# Patient Record
Sex: MALE | Race: WHITE | NOT HISPANIC OR LATINO | Employment: STUDENT | URBAN - METROPOLITAN AREA
[De-identification: names, ages, dates, MRNs, and addresses within clinical notes are randomized per-mention and may not be internally consistent; named-entity substitution may affect disease eponyms.]

---
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
## 2022-08-30 ENCOUNTER — OFFICE VISIT (OUTPATIENT)
Dept: URGENT CARE | Facility: URGENT CARE | Age: 22
End: 2022-08-30
Payer: COMMERCIAL

## 2022-08-30 VITALS
HEART RATE: 58 BPM | TEMPERATURE: 98.7 F | SYSTOLIC BLOOD PRESSURE: 148 MMHG | WEIGHT: 197.2 LBS | OXYGEN SATURATION: 98 % | DIASTOLIC BLOOD PRESSURE: 85 MMHG

## 2022-08-30 DIAGNOSIS — S81.811A LACERATION OF LEG, RIGHT, INITIAL ENCOUNTER: Primary | ICD-10-CM

## 2022-08-30 PROCEDURE — 12001 RPR S/N/AX/GEN/TRNK 2.5CM/<: CPT | Performed by: PHYSICIAN ASSISTANT

## 2022-08-30 ASSESSMENT — ENCOUNTER SYMPTOMS
WHEEZING: 0
CHILLS: 0
PALPITATIONS: 0
CHEST TIGHTNESS: 0
COLOR CHANGE: 1
RHINORRHEA: 0
COUGH: 0
WOUND: 1
SORE THROAT: 0
FEVER: 0
RESPIRATORY NEGATIVE: 1
CARDIOVASCULAR NEGATIVE: 1
SHORTNESS OF BREATH: 0
FATIGUE: 0

## 2022-08-31 NOTE — PROGRESS NOTES
Flavia Rizzo is a 21 year old accompanied by his girlfriend, presenting for the following health issues:  Laceration (Rt leg/calf laceration while rock climbing 08/30/22. )    HPI   Concern - R leg laceration  Onset: today  Description:  Sustained a laceration to his R lower leg after he had fallen from rock climbing.  No head or neck injuries or LOC.  Last Tdap was 2013.  Intensity: moderate  Progression of Symptoms:  same  Accompanying Signs & Symptoms: No radicular pain, numbness, tingling or weakness.  No swelling, redness, drainage or fevers.    Previous history of similar problem: no  Precipitating factors:        Worsened by: none  Alleviating factors:        Improved by: none  Therapies tried and outcome: compression, tegaderm, steristrips with minimal relief    Patient Active Problem List   Diagnosis   (none) - all problems resolved or deleted     No current outpatient medications on file.     No current facility-administered medications for this visit.      No Known Allergies    Review of Systems   Constitutional: Negative for chills, fatigue and fever.   HENT: Negative.  Negative for congestion, ear pain, rhinorrhea and sore throat.    Respiratory: Negative.  Negative for cough, chest tightness, shortness of breath and wheezing.    Cardiovascular: Negative.  Negative for chest pain, palpitations and peripheral edema.   Skin: Positive for color change and wound. Negative for pallor and rash.   All other systems reviewed and are negative.           Objective    BP (!) 148/85 (BP Location: Left arm, Patient Position: Sitting, Cuff Size: Adult Large)   Pulse 58   Temp 98.7  F (37.1  C) (Tympanic)   Wt 89.4 kg (197 lb 3.2 oz)   SpO2 98%   There is no height or weight on file to calculate BMI.  Physical Exam  Vitals and nursing note reviewed.   Constitutional:       General: He is not in acute distress.     Appearance: Normal appearance. He is normal weight. He is not ill-appearing.    Musculoskeletal:      Right lower leg: Laceration (2.5cm vertical laceration present over the lateral lower leg.  no erythema or discharge present) and tenderness present. No swelling, deformity or bony tenderness. No edema.      Left lower leg: Normal.   Skin:     General: Skin is warm.      Capillary Refill: Capillary refill takes less than 2 seconds.   Neurological:      Mental Status: He is alert and oriented to person, place, and time.      Sensory: Sensation is intact.      Motor: Motor function is intact.      Gait: Gait is intact.      Deep Tendon Reflexes: Reflexes are normal and symmetric.   Psychiatric:         Mood and Affect: Mood normal.         Behavior: Behavior normal.         Thought Content: Thought content normal.         Judgment: Judgment normal.     Procedure:  Discussed risks and benefits of procedure and patient had decided to proceed. Laceration was irrigated with normal saline and then sterilized with betadine swabs X3.  This was then anesthetized with 3cc of 1% lidocaine with epi.  Laceration was repaired with five 4-0 ethilon sutures in a simple interrupted fashion and dressed with bacitracin, gauze and coban.  Minimal bleeding.  Patient tolerated procedure well.          Assessment/Plan:  Laceration of leg, right, initial encounter: No evidence of infection at this time.  He is UTD on his Tdap. This was closed with sutures and placed in dressings.  Keep clean and dry for the next 24hours.  Tylenol/ibuprofen as needed for pain.  RTC in 7-10days for suture removal.  RTC sooner if he develops worsening pain, numbness, redness, drainage or fevers.   -     REPAIR SUPERFICIAL, WOUND BODY < =2.5CM        Sue See BULL [place].

## 2022-09-01 ENCOUNTER — NURSE TRIAGE (OUTPATIENT)
Dept: NURSING | Facility: CLINIC | Age: 22
End: 2022-09-01

## 2022-09-01 NOTE — TELEPHONE ENCOUNTER
"Nurse Triage SBAR    Is this a 2nd Level Triage? NO    Situation: Significant Other calling with concern about pt's sutures.  Consent: not needed as pt was able to get on the phone and I spoke to him.     Background:  Significant other Glory calling to share that pt was seen earlier this week and had a leg laceration repaired.  States she is worried about him as he's sleeping more than normal and she is noting redness around the area of his sutures and states it's warm to the touch.  This significant other is not listed on a Consent to Communicate form so at this time.  Advised caller that she would need to either wake up the pt to speak with me or call back when he was awake.  At this time the pt did come to the phone and completed the rest of the triage call with writer.     Assessment:   Pt states mild redness and no concern for infection.  States pain is gone today \"It feels pretty good today\".    No discharge - Pt states this is mildly red/pinkish.  Pt states \"I think it could be from sleeping on it but I could be wrong\".  Pt states he is not noting any increased warmth compared to the other leg.      Protocol Recommended Disposition:   Home Care    Recommendation: Advised patient to do home care. Home care reviewed.  . Reviewed concerning symptoms and when to call back. Pt states he will call back if redness worsens or spreads or if area becomes warm to touch or painful.      Makayla Osorio RN [facility] 2022 6:52 PM          Reason for Disposition    Wound doesn't sound infected    Additional Information    Negative: [1] Widespread rash AND [2] bright red, sunburn-like AND [3] too weak to stand    Negative: Sounds like a life-threatening emergency to the triager    Negative: Stitches (or staples) and not infected    Negative: Skin glue used to close wound and not infected    Negative:  surgical wound infection suspected    Negative: Surgical wound infection suspected " (post-op)    Negative: Animal bite wound infection suspected    Negative: Chronic wound care and Negative Pressure Wound Therapy (NPWT) symptoms and questions    Negative: [1] Widespread rash AND [2] bright red, sunburn-like    Negative: SEVERE pain in the wound    Negative: Black (necrotic) or blisters develop in wound    Negative: Patient sounds very sick or weak to the triager    Negative: [1] Looks infected (spreading redness, red streak, pus) AND [2] fever    Negative: [1] Red streak runs from the wound AND [2] longer than 1 inch (2.5 cm)    Negative: [1] Skin around the wound has become red AND [2] larger than 2 inches (5 cm)    Negative: [1] Face wound AND [2] looks infected (e.g., spreading redness)    Negative: [1] Finger wound AND [2] entire finger swollen    Negative: [1] Red area or streak AND [2] no fever    Negative: [1] Pus or cloudy fluid draining from wound AND [2] no fever    Negative: [1] Wound > 48 hours old AND [2] it becomes more tender    Negative: Other signs of wound infection    Negative: [1] Taking antibiotic > 72 hours (3 days) AND [2] infected wound not improved (i.e., pain, pus, redness)    Negative: [1] Taking antibiotic > 48 hours (2 days) AND [2] fever persists    Negative: [1] No prior tetanus shots (or is not fully vaccinated) AND [2] any wound (e.g., cut, scrape)    Negative: [1] Last tetanus shot > 5 years ago and [2] wound from DIRTY cut or scrape    Negative: Last tetanus shot > 10 years ago    Negative: Pimple where a stitch comes through the skin    Protocols used: WOUND INFECTION-A-AH

## 2022-09-18 ENCOUNTER — HEALTH MAINTENANCE LETTER (OUTPATIENT)
Age: 22
End: 2022-09-18

## 2023-05-01 ENCOUNTER — TRANSCRIBE ORDERS (OUTPATIENT)
Dept: OTHER | Age: 23
End: 2023-05-01

## 2023-05-01 DIAGNOSIS — M25.512 CHRONIC LEFT SHOULDER PAIN: Primary | ICD-10-CM

## 2023-05-01 DIAGNOSIS — Z47.89 ORTHOPEDIC AFTERCARE: ICD-10-CM

## 2023-05-01 DIAGNOSIS — G89.29 CHRONIC LEFT SHOULDER PAIN: Primary | ICD-10-CM

## 2023-05-01 DIAGNOSIS — G25.89 SCAPULAR DYSKINESIS: ICD-10-CM

## 2023-05-01 DIAGNOSIS — M75.41 IMPINGEMENT SYNDROME OF RIGHT SHOULDER: ICD-10-CM

## 2023-05-25 ENCOUNTER — THERAPY VISIT (OUTPATIENT)
Dept: PHYSICAL THERAPY | Facility: CLINIC | Age: 23
End: 2023-05-25
Payer: COMMERCIAL

## 2023-05-25 DIAGNOSIS — M25.511 CHRONIC PAIN OF BOTH SHOULDERS: ICD-10-CM

## 2023-05-25 DIAGNOSIS — G89.29 CHRONIC PAIN OF BOTH SHOULDERS: ICD-10-CM

## 2023-05-25 DIAGNOSIS — M25.512 CHRONIC PAIN OF BOTH SHOULDERS: ICD-10-CM

## 2023-05-25 PROCEDURE — 97110 THERAPEUTIC EXERCISES: CPT | Mod: GP | Performed by: PHYSICAL THERAPIST

## 2023-05-25 PROCEDURE — 97161 PT EVAL LOW COMPLEX 20 MIN: CPT | Mod: GP | Performed by: PHYSICAL THERAPIST

## 2023-05-25 PROCEDURE — 97112 NEUROMUSCULAR REEDUCATION: CPT | Mod: GP | Performed by: PHYSICAL THERAPIST
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
## 2023-05-31 ENCOUNTER — THERAPY VISIT (OUTPATIENT)
Dept: PHYSICAL THERAPY | Facility: CLINIC | Age: 23
End: 2023-05-31
Payer: COMMERCIAL

## 2023-05-31 DIAGNOSIS — M25.512 CHRONIC PAIN OF BOTH SHOULDERS: Primary | ICD-10-CM

## 2023-05-31 DIAGNOSIS — G89.29 CHRONIC PAIN OF BOTH SHOULDERS: Primary | ICD-10-CM

## 2023-05-31 DIAGNOSIS — M25.511 CHRONIC PAIN OF BOTH SHOULDERS: Primary | ICD-10-CM

## 2023-05-31 PROCEDURE — 97112 NEUROMUSCULAR REEDUCATION: CPT | Mod: GP | Performed by: PHYSICAL THERAPIST

## 2023-05-31 PROCEDURE — 97110 THERAPEUTIC EXERCISES: CPT | Mod: GP | Performed by: PHYSICAL THERAPIST

## 2023-06-19 ENCOUNTER — THERAPY VISIT (OUTPATIENT)
Dept: PHYSICAL THERAPY | Facility: CLINIC | Age: 23
End: 2023-06-19
Attending: ORTHOPAEDIC SURGERY
Payer: COMMERCIAL

## 2023-06-19 DIAGNOSIS — G89.29 CHRONIC PAIN OF BOTH SHOULDERS: Primary | ICD-10-CM

## 2023-06-19 DIAGNOSIS — M25.511 CHRONIC PAIN OF BOTH SHOULDERS: Primary | ICD-10-CM

## 2023-06-19 DIAGNOSIS — M25.512 CHRONIC PAIN OF BOTH SHOULDERS: Primary | ICD-10-CM

## 2023-06-19 PROCEDURE — 97112 NEUROMUSCULAR REEDUCATION: CPT | Mod: GP | Performed by: PHYSICAL THERAPIST

## 2023-06-19 PROCEDURE — 97110 THERAPEUTIC EXERCISES: CPT | Mod: GP | Performed by: PHYSICAL THERAPIST

## 2023-07-28 ENCOUNTER — THERAPY VISIT (OUTPATIENT)
Dept: PHYSICAL THERAPY | Facility: CLINIC | Age: 23
End: 2023-07-28
Payer: COMMERCIAL

## 2023-07-28 DIAGNOSIS — M25.511 CHRONIC PAIN OF BOTH SHOULDERS: Primary | ICD-10-CM

## 2023-07-28 DIAGNOSIS — G89.29 CHRONIC PAIN OF BOTH SHOULDERS: Primary | ICD-10-CM

## 2023-07-28 DIAGNOSIS — M25.512 CHRONIC PAIN OF BOTH SHOULDERS: Primary | ICD-10-CM

## 2023-07-28 PROCEDURE — 97112 NEUROMUSCULAR REEDUCATION: CPT | Mod: GP | Performed by: PHYSICAL THERAPIST

## 2023-07-28 PROCEDURE — 97110 THERAPEUTIC EXERCISES: CPT | Mod: GP | Performed by: PHYSICAL THERAPIST

## 2023-09-11 ENCOUNTER — THERAPY VISIT (OUTPATIENT)
Dept: PHYSICAL THERAPY | Facility: CLINIC | Age: 23
End: 2023-09-11
Payer: COMMERCIAL

## 2023-09-11 DIAGNOSIS — G89.29 CHRONIC PAIN OF BOTH SHOULDERS: Primary | ICD-10-CM

## 2023-09-11 DIAGNOSIS — M25.511 CHRONIC PAIN OF BOTH SHOULDERS: Primary | ICD-10-CM

## 2023-09-11 DIAGNOSIS — M25.512 CHRONIC PAIN OF BOTH SHOULDERS: Primary | ICD-10-CM

## 2023-09-11 PROCEDURE — 97110 THERAPEUTIC EXERCISES: CPT | Mod: GP | Performed by: PHYSICAL THERAPIST

## 2023-09-11 PROCEDURE — 97112 NEUROMUSCULAR REEDUCATION: CPT | Mod: GP | Performed by: PHYSICAL THERAPIST

## 2023-10-08 ENCOUNTER — HEALTH MAINTENANCE LETTER (OUTPATIENT)
Age: 23
End: 2023-10-08

## 2023-10-11 ENCOUNTER — THERAPY VISIT (OUTPATIENT)
Dept: PHYSICAL THERAPY | Facility: CLINIC | Age: 23
End: 2023-10-11
Payer: COMMERCIAL

## 2023-10-11 DIAGNOSIS — M25.512 CHRONIC PAIN OF BOTH SHOULDERS: Primary | ICD-10-CM

## 2023-10-11 DIAGNOSIS — G89.29 CHRONIC PAIN OF BOTH SHOULDERS: Primary | ICD-10-CM

## 2023-10-11 DIAGNOSIS — M25.511 CHRONIC PAIN OF BOTH SHOULDERS: Primary | ICD-10-CM

## 2023-10-11 PROCEDURE — 97112 NEUROMUSCULAR REEDUCATION: CPT | Mod: GP | Performed by: PHYSICAL THERAPIST

## 2023-10-11 PROCEDURE — 97110 THERAPEUTIC EXERCISES: CPT | Mod: GP | Performed by: PHYSICAL THERAPIST

## 2023-11-16 ENCOUNTER — THERAPY VISIT (OUTPATIENT)
Dept: PHYSICAL THERAPY | Facility: CLINIC | Age: 23
End: 2023-11-16
Payer: COMMERCIAL

## 2023-11-16 DIAGNOSIS — G89.29 CHRONIC PAIN OF BOTH SHOULDERS: Primary | ICD-10-CM

## 2023-11-16 DIAGNOSIS — M25.512 CHRONIC PAIN OF BOTH SHOULDERS: Primary | ICD-10-CM

## 2023-11-16 DIAGNOSIS — M25.511 CHRONIC PAIN OF BOTH SHOULDERS: Primary | ICD-10-CM

## 2023-11-16 PROCEDURE — 97112 NEUROMUSCULAR REEDUCATION: CPT | Mod: GP | Performed by: PHYSICAL THERAPIST

## 2023-11-16 PROCEDURE — 97110 THERAPEUTIC EXERCISES: CPT | Mod: GP | Performed by: PHYSICAL THERAPIST

## 2023-11-17 NOTE — PROGRESS NOTES
"     11/16/23 0500   Appointment Info   Signing clinician's name / credentials Helena Pearson DPT, SCS   Total/Authorized Visits 6 (alejo e and t)   Visits Used 7   Medical Diagnosis Chronic left shoulder pain    Orthopedic aftercare    Scapular dyskinesis    Impingement syndrome of right shoulder   PT Tx Diagnosis B shoulder pain and instability   Progress Note/Certification   Onset of illness/injury or Date of Surgery   (had surgery in 2017 and subsequent injuries to both labrums in 2019)   Therapy Frequency 1x/wk for 4 weeks then every other week for 8 weeks   Predicted Duration 12 weeks   PT Goal 1   Goal Identifier reaching   Goal Description improve stability without pain for ADLS, and working out pain free with progression to adding chest/back and leg movements   Rationale to maximize safety and independence with performance of ADLs and functional tasks;to maximize safety and independence within the home;to maximize safety and independence with self cares  (to return to throwing a ball, fishing and working out)   Goal Progress pt continues to struggle with pain with various activities, goal extended   Target Date 12/25/23  (goal extended due to ADLS pain and pain with gym workouts)   Subjective Report   Subjective Report He reports that he feels like he has a little better control of his exercises but he doesn't not any changes with pain frequency, duration or intensity. He states that his pain occurs any time he lifts his arm. He reports he feels like he doesn't have to \"think\" about his shoulder pain as much and does report improvement with getting up from the floor, getting dressed,   Objective Measures   Objective Measures Objective Measure 1   Objective Measure 1   Objective Measure tolerated retraction with extension in standing and no longer had pain with prone ER at 90/90 and tolerated S/L ER as well. AROM was full and pain free today but pt says \"sometimes it hurts and I just don't know when it " "will\"   Details AROM is limited at end range but not because of pain today, R shoulder did display slight painful arc from  and L shoulder was pain free but AROM was 140/140/T9/75   Treatment Interventions (PT)   Interventions Therapeutic Procedure/Exercise;Neuromuscular Re-education;Therapeutic Activity   Therapeutic Procedure/Exercise   Therapeutic Procedures: strength, endurance, ROM, flexibillity minutes (46431) 23   Ther Proc 1 seated cervical  retraction without any change and retraction with extension which improved his L shoulder AROM ABD immediately   Ther Proc 1 - Details 2 x 10 no change in ER strength or pain with ABD strength but all mobility was immediately better after retraction with extension   PTRx Ther Proc 1 Prone Shoulder External Rotation   PTRx Ther Proc 1 - Details 20   PTRx Ther Proc 2 Supine Ceiling Punch   PTRx Ther Proc 2 - Details rev   PTRx Ther Proc 3 scap SA wall slide x 15   PTRx Ther Proc 4 Shoulder External Rotation Sidelying   PTRx Ther Proc 4 - Details 15   PTRx Ther Proc 5 rev exercises for gym and ADLS posture to mitigate pain x 6'   PTRx Ther Proc 5 - Details rev   Patient Response/Progress brittny without pain   Neuromuscular Re-education   Neuromuscular re-ed of mvmt, balance, coord, kinesthetic sense, posture, proprioception minutes (49904) 10   Neuro Re-ed 1 rev posture with sleeping and doing his neck exercise first thing in the am   PTRx Neuro Re-ed 1 Seated Cervical Retraction Extension With Overpressure   PTRx Neuro Re-ed 1 - Details rev   PTRx Neuro Re-ed 2 Push-Up Plus At Counter   PTRx Neuro Re-ed 2 - Details x15 NMR for scap ret/dep   PTRx Neuro Re-ed 3 Prone Shoulder Horizontal Abduction   PTRx Neuro Re-ed 3 - Details rev   PTRx Neuro Re-ed 4 Shoulder Proprioception Milk Jug Abduction/Adduction   PTRx Neuro Re-ed 4 - Details rev   PTRx Neuro Re-ed 5 Proprioception At Counter Weight Shift   PTRx Neuro Re-ed 5 - Details 20   Patient Response/Progress pt " demonstrated understanding of proper form/posture   PTRx Neuro Re-ed 6 Serratus Slides on Wall   PTRx Neuro Re-ed 6 - Details 15   PTRx Neuro Re-ed 7 Posture Correction with Lumbar Roll   PTRx Neuro Re-ed 7 - Details x3' education   Skilled Intervention cuing for head posture and scap positioning   Plan   Home program see PTRX   Updates to plan of care took out lifting at gym, Will consider adding in slowly in 4 weeks but only doing 2 exercises at a time for 2 weeks   Plan for next session resume biceps curls and bent over row   Total Session Time   Timed Code Treatment Minutes 33   Total Treatment Time (sum of timed and untimed services) 33       PLAN  See MD tomorrow to determine next steps for shoulder and shoulder pain.     Beginning/End Dates of Progress Note Reporting Period:    to 11/16/2023    Referring Provider:  Naun Parra

## 2024-05-13 PROBLEM — M25.512 CHRONIC PAIN OF BOTH SHOULDERS: Status: RESOLVED | Noted: 2023-05-25 | Resolved: 2024-05-13

## 2024-05-13 PROBLEM — M25.511 CHRONIC PAIN OF BOTH SHOULDERS: Status: RESOLVED | Noted: 2023-05-25 | Resolved: 2024-05-13

## 2024-05-13 PROBLEM — G89.29 CHRONIC PAIN OF BOTH SHOULDERS: Status: RESOLVED | Noted: 2023-05-25 | Resolved: 2024-05-13

## 2024-12-01 ENCOUNTER — HEALTH MAINTENANCE LETTER (OUTPATIENT)
Age: 24
End: 2024-12-01

## 2025-05-27 ENCOUNTER — TRANSCRIBE ORDERS (OUTPATIENT)
Dept: OTHER | Age: 25
End: 2025-05-27

## 2025-05-27 DIAGNOSIS — Z98.890 S/P ARTHROSCOPY OF LEFT SHOULDER: Primary | ICD-10-CM

## 2025-06-11 ENCOUNTER — THERAPY VISIT (OUTPATIENT)
Dept: PHYSICAL THERAPY | Facility: CLINIC | Age: 25
End: 2025-06-11
Payer: COMMERCIAL

## 2025-06-11 DIAGNOSIS — M25.512 ACUTE PAIN OF LEFT SHOULDER: Primary | ICD-10-CM

## 2025-06-11 DIAGNOSIS — Z47.89 AFTERCARE FOLLOWING SURGERY OF THE MUSCULOSKELETAL SYSTEM: ICD-10-CM

## 2025-06-11 PROCEDURE — 97161 PT EVAL LOW COMPLEX 20 MIN: CPT | Mod: GP | Performed by: PHYSICAL THERAPIST

## 2025-06-11 PROCEDURE — 97110 THERAPEUTIC EXERCISES: CPT | Mod: GP | Performed by: PHYSICAL THERAPIST

## 2025-06-11 ASSESSMENT — ACTIVITIES OF DAILY LIVING (ADL)
AT_ITS_WORST?: 4
PLEASE_INDICATE_YOR_PRIMARY_REASON_FOR_REFERRAL_TO_THERAPY:: SHOULDER

## 2025-06-11 NOTE — PROGRESS NOTES
"PHYSICAL THERAPY EVALUATION  Type of Visit: Evaluation        Fall Risk Screen:  Have you fallen 2 or more times in the past year?: Yes  Have you fallen and had an injury in the past year?: No      Subjective         Presenting condition or subjective complaint: He tore his L shoulder when he was water tubing on 7-3-2017 and had surgery in 2017 with Dr. 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 He tore his R labrum in 2019 and has had multiple bouts of PT but he continued to hit a wall and couldn't get stronger. He feels a lot of pain and instability and opted for a SLAP revision and MOSPBT with Dr. 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    Patient goals for therapy: workout or anything physical w/o pain    Pain assessment: See objective evaluation for additional pain details     Objective   Pain:   Location: mostly anterior, sometimes lateral and feels mostly sore and does get some burning \"under the bicep\" and his elbow is sore and stiff. He did have some tingling in his forearm and hand after a dog scared him and he jumped back.    At rest: 2-3/10  With activity: 4-5/10  Quality: dull ache, and a quick soreness  Frequency: Intermittent  Time of Day: worse in the am  Pain is exacerbated by: reaching overhead, behind back, lying on " shoulder and lifting, longer walks, driving  Pain is relieved by: rest, ice    Range of Motion:  AAROM L shoulder: NT due to protocol,  L elbow AAROM: 0-0-140      AROM R shoulder: 151/142/T7/70    Strength:  L shoulder: NT due to post-op state    R shoulder: NT    Palpation: Tension and hypertonicity noted at L UT, LS, rhomboids  Posture: Fwd head, rounded shoulders       Assessment & Plan   CLINICAL IMPRESSIONS  Medical Diagnosis: S/p left shoulder arthroscopic revision SLAP repair, mini open subpectoral biceps tendon transfe    Treatment Diagnosis: L shoulder pain   Impression/Assessment: Patient is a 24 year old male with L shoulder complaints.  The following significant findings have been identified: Pain, Decreased ROM/flexibility, Decreased joint mobility, and Decreased strength. These impairments interfere with their ability to perform self care tasks, work tasks, recreational activities, household chores, and driving  as compared to previous level of function.     Clinical Decision Making (Complexity):  Clinical Presentation: Stable/Uncomplicated  Clinical Presentation Rationale: based on medical and personal factors listed in PT evaluation  Clinical Decision Making (Complexity): Low complexity    PLAN OF CARE  Treatment Interventions:  Interventions: Manual Therapy, Neuromuscular Re-education, Therapeutic Activity, Therapeutic Exercise    Long Term Goals     PT Goal 1  Goal Identifier: reaching  Goal Description: improve ROM, strength to tolerate reaching to top shelf of [place] cabinet with 1-2# pain free without hiking  Rationale: to maximize safety and independence within the home;to maximize safety and independence with performance of ADLs and functional tasks;to maximize safety and independence with self cares  Target Date: 09/03/25      Frequency of Treatment: 1x/wk for 6 weeks then every other week for 12 weeks  Duration of Treatment: 18 weeks    Recommended Referrals to Other Professionals: Physical  Therapy  Education Assessment:        Risks and benefits of evaluation/treatment have been explained.   Patient/Family/caregiver agrees with Plan of Care.     Evaluation Time:     PT Dannie, Low Complexity Minutes (50770): 15  Evaluation Only     Signing Clinician: Helena Velásquez PT

## 2025-06-24 ENCOUNTER — THERAPY VISIT (OUTPATIENT)
Dept: PHYSICAL THERAPY | Facility: CLINIC | Age: 25
End: 2025-06-24
Payer: COMMERCIAL

## 2025-06-24 DIAGNOSIS — Z47.89 AFTERCARE FOLLOWING SURGERY OF THE MUSCULOSKELETAL SYSTEM: ICD-10-CM

## 2025-06-24 DIAGNOSIS — M25.512 ACUTE PAIN OF LEFT SHOULDER: Primary | ICD-10-CM

## 2025-06-24 PROCEDURE — 97110 THERAPEUTIC EXERCISES: CPT | Mod: GP | Performed by: PHYSICAL THERAPIST

## 2025-07-10 ENCOUNTER — THERAPY VISIT (OUTPATIENT)
Dept: PHYSICAL THERAPY | Facility: CLINIC | Age: 25
End: 2025-07-10
Payer: COMMERCIAL

## 2025-07-10 DIAGNOSIS — Z47.89 AFTERCARE FOLLOWING SURGERY OF THE MUSCULOSKELETAL SYSTEM: ICD-10-CM

## 2025-07-10 DIAGNOSIS — M25.512 ACUTE PAIN OF LEFT SHOULDER: Primary | ICD-10-CM

## 2025-07-15 ENCOUNTER — THERAPY VISIT (OUTPATIENT)
Dept: PHYSICAL THERAPY | Facility: CLINIC | Age: 25
End: 2025-07-15
Payer: COMMERCIAL

## 2025-07-15 DIAGNOSIS — Z47.89 AFTERCARE FOLLOWING SURGERY OF THE MUSCULOSKELETAL SYSTEM: ICD-10-CM

## 2025-07-15 DIAGNOSIS — M25.512 ACUTE PAIN OF LEFT SHOULDER: Primary | ICD-10-CM

## 2025-07-15 PROCEDURE — 97110 THERAPEUTIC EXERCISES: CPT | Mod: GP | Performed by: PHYSICAL THERAPIST

## 2025-07-15 PROCEDURE — 97112 NEUROMUSCULAR REEDUCATION: CPT | Mod: GP | Performed by: PHYSICAL THERAPIST

## 2025-07-22 ENCOUNTER — THERAPY VISIT (OUTPATIENT)
Dept: PHYSICAL THERAPY | Facility: CLINIC | Age: 25
End: 2025-07-22
Payer: COMMERCIAL

## 2025-07-22 DIAGNOSIS — Z47.89 AFTERCARE FOLLOWING SURGERY OF THE MUSCULOSKELETAL SYSTEM: ICD-10-CM

## 2025-07-22 DIAGNOSIS — M25.512 ACUTE PAIN OF LEFT SHOULDER: Primary | ICD-10-CM

## 2025-07-22 PROCEDURE — 97110 THERAPEUTIC EXERCISES: CPT | Mod: GP | Performed by: PHYSICAL THERAPIST

## 2025-07-29 ENCOUNTER — THERAPY VISIT (OUTPATIENT)
Dept: PHYSICAL THERAPY | Facility: CLINIC | Age: 25
End: 2025-07-29
Payer: COMMERCIAL

## 2025-07-29 DIAGNOSIS — M25.512 ACUTE PAIN OF LEFT SHOULDER: Primary | ICD-10-CM

## 2025-07-29 DIAGNOSIS — Z47.89 AFTERCARE FOLLOWING SURGERY OF THE MUSCULOSKELETAL SYSTEM: ICD-10-CM

## 2025-07-29 PROCEDURE — 97110 THERAPEUTIC EXERCISES: CPT | Mod: GP | Performed by: PHYSICAL THERAPIST

## 2025-08-05 ENCOUNTER — THERAPY VISIT (OUTPATIENT)
Dept: PHYSICAL THERAPY | Facility: CLINIC | Age: 25
End: 2025-08-05
Payer: COMMERCIAL

## 2025-08-05 DIAGNOSIS — M25.512 ACUTE PAIN OF LEFT SHOULDER: Primary | ICD-10-CM

## 2025-08-05 DIAGNOSIS — Z47.89 AFTERCARE FOLLOWING SURGERY OF THE MUSCULOSKELETAL SYSTEM: ICD-10-CM

## 2025-08-05 PROCEDURE — 97110 THERAPEUTIC EXERCISES: CPT | Mod: GP | Performed by: PHYSICAL THERAPIST

## 2025-08-05 PROCEDURE — 97112 NEUROMUSCULAR REEDUCATION: CPT | Mod: GP | Performed by: PHYSICAL THERAPIST

## 2025-08-21 ENCOUNTER — THERAPY VISIT (OUTPATIENT)
Dept: PHYSICAL THERAPY | Facility: CLINIC | Age: 25
End: 2025-08-21
Payer: COMMERCIAL

## 2025-08-21 DIAGNOSIS — Z47.89 AFTERCARE FOLLOWING SURGERY OF THE MUSCULOSKELETAL SYSTEM: ICD-10-CM

## 2025-08-21 DIAGNOSIS — M25.512 ACUTE PAIN OF LEFT SHOULDER: Primary | ICD-10-CM
